# Patient Record
(demographics unavailable — no encounter records)

---

## 2025-06-12 NOTE — PLAN
[FreeTextEntry1] : - trial low dose synthroid, monitor symptoms and repeat labs in 6-8 weeks  - if no improvement in symptoms and bloodwork, recommend to discuss the symptoms with gyn as it could be from menopause

## 2025-06-12 NOTE — HISTORY OF PRESENT ILLNESS
[FreeTextEntry8] : - labs done at gyn, TSH 4.66, was told to follow up with pcp or endo - TSH was 4.35 in 2022 - states that has been having trouble losing weight, menopause since 48yo  - since 6 weeks ago restricting calories and working out 4-5 times a week  - thinks might have lost a couple of lbs  - has had intermittent mild constipation  - has been more fatigued, low libido  - denies feeling cold, tends to run more hot, still gets intermittent hot flashes